# Patient Record
Sex: MALE | Race: WHITE | NOT HISPANIC OR LATINO | Employment: PART TIME | URBAN - NONMETROPOLITAN AREA
[De-identification: names, ages, dates, MRNs, and addresses within clinical notes are randomized per-mention and may not be internally consistent; named-entity substitution may affect disease eponyms.]

---

## 2018-07-12 ENCOUNTER — OFFICE VISIT (OUTPATIENT)
Dept: FAMILY MEDICINE | Facility: OTHER | Age: 38
End: 2018-07-12
Attending: NURSE PRACTITIONER

## 2018-07-12 VITALS — WEIGHT: 315 LBS | DIASTOLIC BLOOD PRESSURE: 100 MMHG | HEART RATE: 60 BPM | SYSTOLIC BLOOD PRESSURE: 130 MMHG

## 2018-07-12 DIAGNOSIS — R03.0 ELEVATED BLOOD PRESSURE READING WITHOUT DIAGNOSIS OF HYPERTENSION: ICD-10-CM

## 2018-07-12 DIAGNOSIS — R22.9 LOCALIZED SUPERFICIAL SWELLING, MASS, OR LUMP: Primary | ICD-10-CM

## 2018-07-12 PROCEDURE — 99213 OFFICE O/P EST LOW 20 MIN: CPT | Performed by: NURSE PRACTITIONER

## 2018-07-12 ASSESSMENT — PAIN SCALES - GENERAL: PAINLEVEL: NO PAIN (0)

## 2018-07-12 ASSESSMENT — ANXIETY QUESTIONNAIRES
1. FEELING NERVOUS, ANXIOUS, OR ON EDGE: NOT AT ALL
2. NOT BEING ABLE TO STOP OR CONTROL WORRYING: NOT AT ALL

## 2018-07-12 NOTE — PATIENT INSTRUCTIONS
ASSESSMENT/PLAN:     1. Localized superficial swelling, mass, or lump  Suspecting cyst  Referral to general surgery for recommendations on management/removal  If you start to notice redness, warmth, puss, fever, chills return for further evaluation.    2. Elevated blood pressure reading without diagnosis of hypertension  He and his significant other are relating today's elevated reading to nerves of being here today.  Encouraged him to monitor outpatient blood pressures. Daughter is a nurse and can do this for him. If systolic readings are consistently 140 or higher AND/OR diastolic readings are 90 or higher you should return to the clinic for management of elevated readings.  Education on effects of untreated high blood pressure.  Heart Healthy Diet   - Limit daily sodium intake to less than 2 grams (2,000 mg) (Can reduce systolic BP by 2-8 mm HG)  - Ensure you are eating 2-3 servings of vegetables and 2 servings of fruit each day  - Low-fat dairy products  - Minimize foods with saturated fats such as fried foods, processed meats such as sausage, chips, full fat dairy products    Weight loss, if necessary (can reduce systolic BP by 5-20 mm Hg per 10 kg)      Limit alcohol intake (can reduce systolic BP by 2-4 mm Hg)    Smoking/ tobacco cessation     Exercise (can reduce systolic BP by 2-8 mm Hg)  - 30 minutes of moderate activity 5-6 days per day or 30 minutes of vigorous activity 3-4 days per week. The goal is to increase heart rate. Ex: 15 minute brisk walk twice daily.        Carolina Beck, CNP  [facility] AND [facility]

## 2018-07-12 NOTE — PROGRESS NOTES
SUBJECTIVE:   Matt Dunn is a 37 year old male who presents to clinic today for the following health issues:    Lump on Leg  Left lower extremity, just inferior to knee  Fell when younger and bump has been present ever since. Has never had any issues with it and has never been bothersome.  Denies pain to area.   Denies associated symptoms of paresthesias, fever, chills, arthralgias, myalgias.  For the last 2-3 weeks lump has been draining clear-yellow fluid which it has not done in the past.  He is here with his girlfriend who is concerned about it being an abscess.          Problem list and histories reviewed & adjusted, as indicated.  Additional history: as documented    There is no problem list on file for this patient.    History reviewed. No pertinent surgical history.    Social History   Substance Use Topics     Smoking status: Never Smoker     Smokeless tobacco: Never Used     Alcohol use Yes      Comment: occ     History reviewed. No pertinent family history.      No current outpatient prescriptions on file.     No Known Allergies  No lab results found.   BP Readings from Last 3 Encounters:   07/12/18 (!) 130/100    Wt Readings from Last 3 Encounters:   07/12/18 323 lb (146.5 kg)               Reviewed and updated as needed this visit by clinical staff  Tobacco  Allergies  Meds  Med Hx  Surg Hx  Fam Hx  Soc Hx      Reviewed and updated as needed this visit by Provider         ROS:  Constitutional, HEENT, cardiovascular, pulmonary, gi and gu systems are negative, except as otherwise noted.    OBJECTIVE:     BP (!) 130/100 (BP Location: Right arm, Patient Position: Sitting, Cuff Size: Adult Large)  Pulse 60  Wt 323 lb (146.5 kg)  There is no height or weight on file to calculate BMI.     GENERAL: healthy, alert and no distress  MS: no gross musculoskeletal defects noted, no edema  SKIN: 4.0 cm x 3.5 cm, soft lump on left lower extremity inferior to knee with small 0.3 cm opening that is actively  draining serous fluid. No surrounding erythema or warmth. No streaking. No purulent drainage.   NEURO: Normal strength and tone, mentation intact and speech normal  PSYCH: mentation appears normal, affect normal/bright    Diagnostic Test Results:  none     ASSESSMENT/PLAN:     1. Localized superficial swelling, mass, or lump  Suspecting cyst  Referral to general surgery for recommendations on management/removal  If you start to notice redness, warmth, puss, fever, chills return for further evaluation.    2. Elevated blood pressure reading without diagnosis of hypertension  He and his significant other are relating today's elevated reading to nerves of being here today.  Encouraged him to monitor outpatient blood pressures. Daughter is a nurse and can do this for him. If systolic readings are consistently 140 or higher AND/OR diastolic readings are 90 or higher you should return to the clinic for management of elevated readings.  Education on effects of untreated high blood pressure.  Hearth Healthy Diet   - Limit daily sodium intake to less than 2 grams (2,000 mg) (Can reduce systolic BP by 2-8 mm HG)  - Ensure you are eating 2-3 servings of vegetables and 2 servings of fruit each day  - Low-fat dairy products  - Minimize foods with saturated fats such as fried foods, processed meats such as sausage, chips, full fat dairy products    Weight loss, if necessary (can reduce systolic BP by 5-20 mm Hg per 10 kg)      Limit alcohol intake (can reduce systolic BP by 2-4 mm Hg)    Smoking/ tobacco cessation     Exercise (can reduce systolic BP by 2-8 mm Hg)  - 30 minutes of moderate activity 5-6 days per day or 30 minutes of vigorous activity 3-4 days per week. The goal is to increase heart rate. Ex: 15 minute brisk walk twice daily.        Carolina Beck, [facility] AND [facility]

## 2018-07-12 NOTE — MR AVS SNAPSHOT
After Visit Summary   7/12/2018    Matt Dunn    MRN: [id number]           Patient Information     Date Of Birth          1980        Visit Information        Provider Department      7/12/2018 8:00 AM Carolina Beck CNP [facility]        Today's Diagnoses     Localized superficial swelling, mass, or lump    -  1    Elevated blood pressure reading without diagnosis of hypertension          Care Instructions    ASSESSMENT/PLAN:     1. Localized superficial swelling, mass, or lump  Suspecting cyst  Referral to general surgery for recommendations on management/removal  If you start to notice redness, warmth, puss, fever, chills return for further evaluation.    2. Elevated blood pressure reading without diagnosis of hypertension  He and his significant other are relating today's elevated reading to nerves of being here today.  Encouraged him to monitor outpatient blood pressures. Daughter is a nurse and can do this for him. If systolic readings are consistently 140 or higher AND/OR diastolic readings are 90 or higher you should return to the clinic for management of elevated readings.  Education on effects of untreated high blood pressure.  Hearth Healthy Diet   - Limit daily sodium intake to less than 2 grams (2,000 mg) (Can reduce systolic BP by 2-8 mm HG)  - Ensure you are eating 2-3 servings of vegetables and 2 servings of fruit each day  - Low-fat dairy products  - Minimize foods with saturated fats such as fried foods, processed meats such as sausage, chips, full fat dairy products    Weight loss, if necessary (can reduce systolic BP by 5-20 mm Hg per 10 kg)      Limit alcohol intake (can reduce systolic BP by 2-4 mm Hg)    Smoking/ tobacco cessation     Exercise (can reduce systolic BP by 2-8 mm Hg)  - 30 minutes of moderate activity 5-6 days per day or 30 minutes of vigorous activity 3-4 days per week. The goal is to increase heart rate. Ex: 15 minute brisk walk  "twice daily.        Carolina Beck CNP  [facility]          Follow-ups after your visit        Who to contact     If you have questions or need follow up information about today's clinic visit or your schedule please contact [facility] directly at [phone].  Normal or non-critical lab and imaging results will be communicated to you by MyChart, letter or phone within 4 business days after the clinic has received the results. If you do not hear from us within 7 days, please contact the clinic through MyChart or phone. If you have a critical or abnormal lab result, we will notify you by phone as soon as possible.  Submit refill requests through [employer] or call your pharmacy and they will forward the refill request to us. Please allow 3 business days for your refill to be completed.          Additional Information About Your Visit        MyChart Information     [employer] lets you send messages to your doctor, view your test results, renew your prescriptions, schedule appointments and more. To sign up, go to [email or web address] . Click on \"Log in\" on the left side of the screen, which will take you to the Welcome page. Then click on \"Sign up Now\" on the right side of the page.     You will be asked to enter the access code listed below, as well as some personal information. Please follow the directions to create your username and password.     Your access code is: [email or web address]  Expires: 10/10/2018  8:37 AM     Your access code will  in 90 days. If you need help or a new code, please call your [facility] clinic or [phone].        Care EveryWhere ID     This is your Care EveryWhere ID. This could be used by other organizations to access your [facility] medical records  [id number]        Your Vitals Were     Pulse                   60            Blood Pressure from Last 3 Encounters:   18 (!) 130/100    Weight from Last 3 Encounters:   18 323 lb " (146.5 kg)              Today, you had the following     No orders found for display       Primary Care Provider Fax #    Physician No Ref-Primary [phone]       No address on file        Equal Access to Services     OLGA JANINA : Hadii aad ku hadgenevaughn Bolton, shayna dalemele, stephan kamaggie douglas, marisol negron shalommartinez aparicio lajackileanne dunne. So [facility] [phone].    ATENCIÓN: Si habla español, tiene a ko disposición servicios gratuitos de asistencia lingüística. Llame al [phone].    We comply with applicable federal civil rights laws and [place] laws. We do not discriminate on the basis of race, color, national origin, age, disability, sex, sexual orientation, or gender identity.            Thank you!     Thank you for choosing [facility] AND [facility]  for your care. Our goal is always to provide you with excellent care. Hearing back from our patients is one way we can continue to improve our services. Please take a few minutes to complete the written survey that you may receive in the mail after your visit with us. Thank you!             Your Updated Medication List - Protect others around you: Learn how to safely use, store and throw away your medicines at www.disposemymeds.org.      Notice  As of 7/12/2018  8:37 AM    You have not been prescribed any medications.

## 2018-07-12 NOTE — NURSING NOTE
Patient presents to the clinic for left leg swelling and a bump below the knee.  Jatin Sanders ..............7/12/2018 8:09 AM

## 2018-07-24 ENCOUNTER — OFFICE VISIT (OUTPATIENT)
Dept: SURGERY | Facility: OTHER | Age: 38
End: 2018-07-24
Attending: NURSE PRACTITIONER

## 2018-07-24 VITALS
SYSTOLIC BLOOD PRESSURE: 138 MMHG | HEIGHT: 74 IN | BODY MASS INDEX: 40.43 KG/M2 | WEIGHT: 315 LBS | DIASTOLIC BLOOD PRESSURE: 86 MMHG

## 2018-07-24 DIAGNOSIS — R22.9 LOCALIZED SUPERFICIAL SWELLING, MASS, OR LUMP: Primary | ICD-10-CM

## 2018-07-24 DIAGNOSIS — M70.52 INFRAPATELLAR BURSITIS OF LEFT KNEE: ICD-10-CM

## 2018-07-24 PROCEDURE — 99242 OFF/OP CONSLTJ NEW/EST SF 20: CPT | Performed by: SURGERY

## 2018-07-24 ASSESSMENT — PAIN SCALES - GENERAL: PAINLEVEL: NO PAIN (0)

## 2018-07-24 NOTE — MR AVS SNAPSHOT
"              After Visit Summary   7/24/2018    Matt Dunn    MRN: [id number]           Patient Information     Date Of Birth          1980        Visit Information        Provider Department      7/24/2018 3:20 PM Corine Britton MD [facility]        Today's Diagnoses     Localized superficial swelling, mass, or lump    -  1    Infrapatellar bursitis of left knee           Follow-ups after your visit        Additional Services     ORTHOPEDICS ADULT REFERRAL       Your provider has referred you to: orthopedics                  Follow-up notes from your care team     Return if symptoms worsen or fail to improve.      Who to contact     If you have questions or need follow up information about today's clinic visit or your schedule please contact [facility] directly at [phone].  Normal or non-critical lab and imaging results will be communicated to you by [employer], letter or phone within 4 business days after the clinic has received the results. If you do not hear from us within 7 days, please contact the clinic through [employer] or phone. If you have a critical or abnormal lab result, we will notify you by phone as soon as possible.  Submit refill requests through [employer] or call your pharmacy and they will forward the refill request to us. Please allow 3 business days for your refill to be completed.          Additional Information About Your Visit        [employer] Information     [employer] lets you send messages to your doctor, view your test results, renew your prescriptions, schedule appointments and more. To sign up, go to [email or web address] . Click on \"Log in\" on the left side of the screen, which will take you to the Welcome page. Then click on \"Sign up Now\" on the right side of the page.     You will be asked to enter the access code listed below, as well as some personal information. Please follow the directions to create your username and password.   " "  Your access code is: [email or web address]  Expires: 10/10/2018  8:37 AM     Your access code will  in 90 days. If you need help or a new code, please call your [facility] or [phone].        Care EveryWhere ID     This is your Care EveryWhere ID. This could be used by other organizations to access your [place] medical records  [id number]        Your Vitals Were     Height BMI (Body Mass Index)                6' 2\" (1.88 m) 41.52 kg/m2           Blood Pressure from Last 3 Encounters:   18 138/86   18 (!) 130/100    Weight from Last 3 Encounters:   18 323 lb 6.4 oz (146.7 kg)   18 323 lb (146.5 kg)              We Performed the Following     ORTHOPEDICS ADULT REFERRAL        Primary Care Provider Fax #    Physician No Ref-Primary [phone]       No address on file        Equal Access to Services     OLGA ROA : Hadii nilesh buenrostroo Sochristopher, waaxda luqadaha, qaybta kaalmada adeegyada, marisol diaz . So [facility] [place] [facility] [phone].    ATENCIÓN: Si habla español, tiene a ko disposición servicios gratuitos de asistencia lingüística. Llame al [phone].    We comply with applicable federal civil rights laws and [place] laws. We do not discriminate on the basis of race, color, national origin, age, disability, sex, sexual orientation, or gender identity.            Thank you!     Thank you for choosing [facility] AND [facility]  for your care. Our goal is always to provide you with excellent care. Hearing back from our patients is one way we can continue to improve our services. Please take a few minutes to complete the written survey that you may receive in the mail after your visit with us. Thank you!             Your Updated Medication List - Protect others around you: Learn how to safely use, store and throw away your medicines at www.disposemymeds.org.      Notice  As of 2018 11:59 PM    You have not been prescribed any medications.      "

## 2018-07-24 NOTE — NURSING NOTE
Patient presents to clinic with a lump below his knee cap.  It's been there several weeks.  Leandra Yo LPN  7/24/2018  3:26 PM

## 2018-07-25 ENCOUNTER — TRANSFERRED RECORDS (OUTPATIENT)
Dept: HEALTH INFORMATION MANAGEMENT | Facility: CLINIC | Age: 38
End: 2018-07-25

## 2018-07-25 ENCOUNTER — RADIANT APPOINTMENT (OUTPATIENT)
Dept: GENERAL RADIOLOGY | Facility: OTHER | Age: 38
End: 2018-07-25
Attending: SPECIALIST

## 2018-07-25 DIAGNOSIS — M25.569 KNEE PAIN: ICD-10-CM

## 2018-07-25 PROCEDURE — 73560 X-RAY EXAM OF KNEE 1 OR 2: CPT | Mod: TC

## 2018-07-31 PROBLEM — M70.50 INFRAPATELLAR BURSITIS: Status: ACTIVE | Noted: 2018-07-31

## 2018-07-31 NOTE — PROGRESS NOTES
OFFICE CONSULTATION NOTE  Patient Name: Matt Dunn  Address: [address] [place] [id number]  Age:37 year old  Sex: male     Primary Care Physician: No Ref-Primary, Physician    Sanjuana requested to see this patient in consultation by Carolina Beck NP for evaluation of left leg swelling. A copy of this note will be sent to Carolina.    HPI:   The patient is 37 year old male with a lump just below his left knee for along time but the last several weeks it is larger. It is soft and tender when he kneels but not really painful otherwise. It has been draining for a weeks. The fluid is clear and yellow to pink. No cloudy or foul smelling fluid. He hasn't had fever. No chills. 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 Ortho referral ordered. Patient expressed understanding. Will call if concerns.  REVIEW OF SYSTEMS  GENERAL: No fevers or chills. Denies fatigue, recent weight loss.  LYMPHATICS:  No swollen nodes in axilla, neck or groin.  CARDIOVASCULAR: Denies chest pain, palpitations and dyspnea onexertion.  PULMONARY: No shortness of breath or cough. No increase in sputum production.  SKIN: No recent rashes   HEMATOLOGY:  No history of easy bruising or bleeding.  ENDOCRINE:  No history of diabetes or thyroid problems.  NEUROLOGY:  No history of seizures or headaches. No motor or sensory changes.    PAST MEDICAL HISTORY  No past medical history on file.  PAST SURGICAL HISTORY  No past surgical history on file.  CURRENT MEDS  No current outpatient prescriptions on file.     No current facility-administered medications for this visit.      ALLERGIES/SENSITIVITIES  No Known Allergies  FAMILY HISTORY  No family history on file.  SOCIAL HISTORY  Social History     Social History     Marital status:      Spouse name: N/A     Number of children: N/A     Years of education: N/A     Social  "History Main Topics     Smoking status: Never Smoker     Smokeless tobacco: Never Used     Alcohol use Yes      Comment: occ     Drug use: No     Sexual activity: Not Asked     Other Topics Concern     None     Social History Narrative       PHYSICAL EXAM  Vitals: /86 (BP Location: Right arm, Patient Position: Sitting, Cuff Size: Adult Large)  Ht 6' 2\" (1.88 m)  Wt 323 lb 6.4 oz (146.7 kg)  BMI 41.52 kg/m2  BMI= Body mass index is 41.52 kg/(m^2).    GENERAL: Healthy appearing patient in no acute distress. Pleasant and cooperativewith exam and interview.   HEENT: Head-normocephalic. Eyes-no scleral icterus, pupils equal, round, and reactive to light. Nose-no nasal drainage. No lesions. Mouth-oral mucosa pink and moist, no lesions.  NECK:Supple. No thyroid nodules. Trachea midline.  SKIN: Pink, warm and dry. No jaundice. No rash. left lower leg with 3 x 4 cm fluid collection with 0.4 cm open area draining serous fluid. Minimal tenderness. No erythema or induration.   PSYCH: Appropriate mood and affect.      "